# Patient Record
Sex: MALE | URBAN - METROPOLITAN AREA
[De-identification: names, ages, dates, MRNs, and addresses within clinical notes are randomized per-mention and may not be internally consistent; named-entity substitution may affect disease eponyms.]

---

## 2021-03-23 ENCOUNTER — IMPORTED ENCOUNTER (OUTPATIENT)
Dept: URBAN - METROPOLITAN AREA CLINIC 9 | Facility: CLINIC | Age: 46
End: 2021-03-23

## 2021-10-16 ASSESSMENT — VISUAL ACUITY
OS_SC: 20/25 -2 SN
OD_SC: 20/30 -2 SN

## 2021-10-16 ASSESSMENT — TONOMETRY: OS_IOP_MMHG: 13

## 2022-06-13 ENCOUNTER — ESTABLISHED PATIENT (OUTPATIENT)
Dept: URBAN - METROPOLITAN AREA CLINIC 4 | Facility: CLINIC | Age: 47
End: 2022-06-13

## 2022-06-13 DIAGNOSIS — H21.541: ICD-10-CM

## 2022-06-13 DIAGNOSIS — H35.353: ICD-10-CM

## 2022-06-13 PROCEDURE — 92014 COMPRE OPH EXAM EST PT 1/>: CPT

## 2022-06-13 PROCEDURE — 92134 CPTRZ OPH DX IMG PST SGM RTA: CPT

## 2022-06-13 ASSESSMENT — VISUAL ACUITY
OS_SC: 20/50
OD_SC: 20/40

## 2022-06-13 ASSESSMENT — TONOMETRY
OD_IOP_MMHG: 11
OS_IOP_MMHG: 18

## 2022-06-15 ENCOUNTER — ESTABLISHED PATIENT (OUTPATIENT)
Dept: URBAN - METROPOLITAN AREA CLINIC 4 | Facility: CLINIC | Age: 47
End: 2022-06-15

## 2022-06-15 DIAGNOSIS — H21.541: ICD-10-CM

## 2022-06-15 DIAGNOSIS — H35.353: ICD-10-CM

## 2022-06-15 DIAGNOSIS — H20.011: ICD-10-CM

## 2022-06-15 PROCEDURE — 92014 COMPRE OPH EXAM EST PT 1/>: CPT

## 2022-06-15 PROCEDURE — 92134 CPTRZ OPH DX IMG PST SGM RTA: CPT

## 2022-06-15 ASSESSMENT — VISUAL ACUITY
OD_SC: 20/40
OS_SC: 20/50

## 2022-06-15 ASSESSMENT — TONOMETRY
OD_IOP_MMHG: 11
OS_IOP_MMHG: 12

## 2022-06-22 ENCOUNTER — FOLLOW UP (OUTPATIENT)
Dept: URBAN - METROPOLITAN AREA CLINIC 4 | Facility: CLINIC | Age: 47
End: 2022-06-22

## 2022-06-22 DIAGNOSIS — H20.011: ICD-10-CM

## 2022-06-22 DIAGNOSIS — H35.353: ICD-10-CM

## 2022-06-22 DIAGNOSIS — H21.541: ICD-10-CM

## 2022-06-22 PROCEDURE — 92012 INTRM OPH EXAM EST PATIENT: CPT

## 2022-06-22 PROCEDURE — 92134 CPTRZ OPH DX IMG PST SGM RTA: CPT

## 2022-06-22 ASSESSMENT — VISUAL ACUITY
OU_SC: 20/30-1
OS_SC: 20/40-1
OD_SC: 20/30-1

## 2022-06-22 ASSESSMENT — TONOMETRY
OD_IOP_MMHG: 15
OS_IOP_MMHG: 16

## 2022-06-29 ENCOUNTER — FOLLOW UP (OUTPATIENT)
Dept: URBAN - METROPOLITAN AREA CLINIC 4 | Facility: CLINIC | Age: 47
End: 2022-06-29

## 2022-06-29 DIAGNOSIS — H20.011: ICD-10-CM

## 2022-06-29 DIAGNOSIS — H35.353: ICD-10-CM

## 2022-06-29 PROCEDURE — 92012 INTRM OPH EXAM EST PATIENT: CPT

## 2022-06-29 PROCEDURE — 92134 CPTRZ OPH DX IMG PST SGM RTA: CPT

## 2022-06-29 ASSESSMENT — TONOMETRY
OS_IOP_MMHG: 16
OD_IOP_MMHG: 16

## 2022-06-29 ASSESSMENT — VISUAL ACUITY
OS_SC: 20/40
OU_SC: 20/400
OD_SC: 20/30-1

## 2022-07-05 RX ORDER — HYDROCODONE BITARTRATE AND ACETAMINOPHEN 5; 325 MG/1; MG/1
TABLET ORAL
COMMUNITY
Start: 2021-11-08

## 2022-07-27 ENCOUNTER — ESTABLISHED PATIENT (OUTPATIENT)
Dept: URBAN - METROPOLITAN AREA CLINIC 4 | Facility: CLINIC | Age: 47
End: 2022-07-27

## 2022-07-27 DIAGNOSIS — H20.013: ICD-10-CM

## 2022-07-27 PROCEDURE — 99213 OFFICE O/P EST LOW 20 MIN: CPT

## 2022-07-27 ASSESSMENT — TONOMETRY
OS_IOP_MMHG: 18
OD_IOP_MMHG: 14

## 2022-07-27 ASSESSMENT — VISUAL ACUITY
OS_SC: 20/40+1
OD_SC: 20/20-1

## 2022-11-11 NOTE — PATIENT DISCUSSION
Advised pt of findings. Use warm compresses OU BID for 5-10 minutes each time. Use Retaine MGD OU QID / Hypochlor Spray QD OU/  Use Ocusoft eyelid cleansers to cleanse lash line daily.

## 2022-11-11 NOTE — PATIENT DISCUSSION
DUE TO SIGNIFICANT DAMAGE TO THE RIGHT EYE, WILL START ROCKLATAN TO THE RIGHT EYE, IN ADDITION TO THE LEFT.

## 2023-01-11 ENCOUNTER — ESTABLISHED PATIENT (OUTPATIENT)
Dept: URBAN - METROPOLITAN AREA CLINIC 4 | Facility: CLINIC | Age: 48
End: 2023-01-11

## 2023-01-11 DIAGNOSIS — H21.541: ICD-10-CM

## 2023-01-11 DIAGNOSIS — H20.031: ICD-10-CM

## 2023-01-11 DIAGNOSIS — H35.353: ICD-10-CM

## 2023-01-11 PROCEDURE — 92134 CPTRZ OPH DX IMG PST SGM RTA: CPT

## 2023-01-11 PROCEDURE — 92012 INTRM OPH EXAM EST PATIENT: CPT

## 2023-01-11 ASSESSMENT — VISUAL ACUITY
OD_SC: CF 3FT
OS_SC: 20/60+1

## 2023-01-11 ASSESSMENT — TONOMETRY
OS_IOP_MMHG: 13
OD_IOP_MMHG: 17

## 2023-01-25 ENCOUNTER — ESTABLISHED PATIENT (OUTPATIENT)
Dept: URBAN - METROPOLITAN AREA CLINIC 4 | Facility: CLINIC | Age: 48
End: 2023-01-25

## 2023-01-25 DIAGNOSIS — H21.541: ICD-10-CM

## 2023-01-25 DIAGNOSIS — H20.031: ICD-10-CM

## 2023-01-25 PROCEDURE — 92012 INTRM OPH EXAM EST PATIENT: CPT

## 2023-01-25 ASSESSMENT — VISUAL ACUITY
OD_SC: 20/40-1
OS_SC: 20/60

## 2023-01-25 ASSESSMENT — TONOMETRY
OS_IOP_MMHG: 16
OD_IOP_MMHG: 19